# Patient Record
Sex: FEMALE | Race: WHITE | URBAN - METROPOLITAN AREA
[De-identification: names, ages, dates, MRNs, and addresses within clinical notes are randomized per-mention and may not be internally consistent; named-entity substitution may affect disease eponyms.]

---

## 2017-01-25 ENCOUNTER — TELEPHONE (OUTPATIENT)
Dept: FAMILY MEDICINE CLINIC | Facility: CLINIC | Age: 52
End: 2017-01-25

## 2017-01-25 NOTE — TELEPHONE ENCOUNTER
Patient calling to be sure information was sent to Dr Stephanie Wray. Informed-Yes all PreOp Testing/OV Faxed 1/19/17. Patient also has Medical Records to  at . Asking that  Marely Fernandez to . Patient's name added to envelope.

## 2017-01-25 NOTE — TELEPHONE ENCOUNTER
I faxed the H&P & EKG to Dr. Stevens [facility] office     [phone]    Information pulled from [facility]

## 2017-01-27 ENCOUNTER — HOSPITAL (OUTPATIENT)
Dept: OTHER | Age: 52
End: 2017-01-27
Attending: PHYSICIAN ASSISTANT

## 2017-01-27 ENCOUNTER — CHARTING TRANS (OUTPATIENT)
Dept: OTHER | Age: 52
End: 2017-01-27

## 2018-08-07 ENCOUNTER — MED REC SCAN ONLY (OUTPATIENT)
Dept: FAMILY MEDICINE CLINIC | Facility: CLINIC | Age: 53
End: 2018-08-07

## 2020-03-25 ENCOUNTER — TELEPHONE (OUTPATIENT)
Dept: FAMILY MEDICINE CLINIC | Facility: CLINIC | Age: 55
End: 2020-03-25

## 2020-03-25 NOTE — TELEPHONE ENCOUNTER
FYI: pt was called to reschedule her 4/8 appt with Mynor Ernandez but she is not able to do this- pt already bought her plane ticket for this appt- ok to leave on schedule per Bonny Dakins B.

## 2020-04-03 NOTE — TELEPHONE ENCOUNTER
Talked to the patient today and she canceled her flight so she will not be coming in. She is having some concerns, so would like a call back. Please call Monday, 4/6/2020. She is in [place], so she is 2 hours behind us.     Boubacar Wolfe  verbally consents

## 2020-04-03 NOTE — TELEPHONE ENCOUNTER
Phone visit not allowed on out of state patients. Please check with her MOnday if any questions.  She may have to seek acute medical care if needed in [facility]

## 2020-04-06 NOTE — TELEPHONE ENCOUNTER
Information shared by patient reviewed. Patient to be encouraged to follow-up at primary care in [place] where she has been residing for the last 2 years.

## 2020-04-06 NOTE — TELEPHONE ENCOUNTER
Pt contacted. Pt last seen prior to 2017- not able to locate exact date. Pt states she moved to [id number] [place] [address] back 6/2018. Pt did not establish care with provider out there- pt states she hasn't needed a doctor.     Pt mentioned that she had ER visit last year [facility]